# Patient Record
Sex: FEMALE | Race: WHITE
[De-identification: names, ages, dates, MRNs, and addresses within clinical notes are randomized per-mention and may not be internally consistent; named-entity substitution may affect disease eponyms.]

---

## 2019-08-28 ENCOUNTER — HOSPITAL ENCOUNTER (OUTPATIENT)
Dept: HOSPITAL 62 - ER | Age: 44
Setting detail: OBSERVATION
LOS: 2 days | Discharge: HOME | End: 2019-08-30
Attending: PAIN MEDICINE | Admitting: PAIN MEDICINE
Payer: MEDICARE

## 2019-08-28 DIAGNOSIS — E66.01: ICD-10-CM
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
## 2019-08-28 DIAGNOSIS — M25.551: ICD-10-CM

## 2019-08-28 DIAGNOSIS — Z79.899: ICD-10-CM

## 2019-08-28 DIAGNOSIS — Z79.82: ICD-10-CM

## 2019-08-28 DIAGNOSIS — M54.16: ICD-10-CM

## 2019-08-28 DIAGNOSIS — F32.9: ICD-10-CM

## 2019-08-28 DIAGNOSIS — G89.4: ICD-10-CM

## 2019-08-28 DIAGNOSIS — T40.2X5A: ICD-10-CM

## 2019-08-28 DIAGNOSIS — E78.5: ICD-10-CM

## 2019-08-28 DIAGNOSIS — K21.9: ICD-10-CM

## 2019-08-28 DIAGNOSIS — M96.1: ICD-10-CM

## 2019-08-28 DIAGNOSIS — F11.23: ICD-10-CM

## 2019-08-28 DIAGNOSIS — I10: ICD-10-CM

## 2019-08-28 DIAGNOSIS — M47.816: ICD-10-CM

## 2019-08-28 DIAGNOSIS — T85.615A: Primary | ICD-10-CM

## 2019-08-28 DIAGNOSIS — R29.6: ICD-10-CM

## 2019-08-28 DIAGNOSIS — M54.17: ICD-10-CM

## 2019-08-28 LAB
ADD MANUAL DIFF: NO
ALBUMIN SERPL-MCNC: 4.1 G/DL (ref 3.5–5)
ALP SERPL-CCNC: 109 U/L (ref 38–126)
ANION GAP SERPL CALC-SCNC: 10 MMOL/L (ref 5–19)
APPEARANCE UR: (no result)
APTT BLD: 24.2 SEC (ref 23.5–35.8)
APTT PPP: YELLOW S
AST SERPL-CCNC: 20 U/L (ref 14–36)
BASOPHILS # BLD AUTO: 0 10^3/UL (ref 0–0.2)
BASOPHILS NFR BLD AUTO: 0.4 % (ref 0–2)
BILIRUB DIRECT SERPL-MCNC: 0.4 MG/DL (ref 0–0.4)
BILIRUB SERPL-MCNC: 0.9 MG/DL (ref 0.2–1.3)
BILIRUB UR QL STRIP: NEGATIVE
BUN SERPL-MCNC: 10 MG/DL (ref 7–20)
CALCIUM: 9.5 MG/DL (ref 8.4–10.2)
CHLORIDE SERPL-SCNC: 98 MMOL/L (ref 98–107)
CO2 SERPL-SCNC: 27 MMOL/L (ref 22–30)
EOSINOPHIL # BLD AUTO: 0 10^3/UL (ref 0–0.6)
EOSINOPHIL NFR BLD AUTO: 0 % (ref 0–6)
ERYTHROCYTE [DISTWIDTH] IN BLOOD BY AUTOMATED COUNT: 13.1 % (ref 11.5–14)
GLUCOSE SERPL-MCNC: 126 MG/DL (ref 75–110)
GLUCOSE UR STRIP-MCNC: NEGATIVE MG/DL
HCT VFR BLD CALC: 38.9 % (ref 36–47)
HGB BLD-MCNC: 13.3 G/DL (ref 12–15.5)
INR PPP: 0.99
KETONES UR STRIP-MCNC: (no result) MG/DL
LYMPHOCYTES # BLD AUTO: 1.1 10^3/UL (ref 0.5–4.7)
LYMPHOCYTES NFR BLD AUTO: 10 % (ref 13–45)
MCH RBC QN AUTO: 30.2 PG (ref 27–33.4)
MCHC RBC AUTO-ENTMCNC: 34.2 G/DL (ref 32–36)
MCV RBC AUTO: 88 FL (ref 80–97)
MONOCYTES # BLD AUTO: 0.4 10^3/UL (ref 0.1–1.4)
MONOCYTES NFR BLD AUTO: 3.4 % (ref 3–13)
NEUTROPHILS # BLD AUTO: 9.1 10^3/UL (ref 1.7–8.2)
NEUTS SEG NFR BLD AUTO: 86.2 % (ref 42–78)
NITRITE UR QL STRIP: NEGATIVE
PH UR STRIP: 6 [PH] (ref 5–9)
PLATELET # BLD: 282 10^3/UL (ref 150–450)
POTASSIUM SERPL-SCNC: 3.5 MMOL/L (ref 3.6–5)
PROT SERPL-MCNC: 7.2 G/DL (ref 6.3–8.2)
PROT UR STRIP-MCNC: NEGATIVE MG/DL
PROTHROMBIN TIME: 13.1 SEC (ref 11.4–15.4)
RBC # BLD AUTO: 4.4 10^6/UL (ref 3.72–5.28)
SP GR UR STRIP: 1.02
TOTAL CELLS COUNTED % (AUTO): 100 %
UROBILINOGEN UR-MCNC: NEGATIVE MG/DL (ref ?–2)
WBC # BLD AUTO: 10.5 10^3/UL (ref 4–10.5)

## 2019-08-28 PROCEDURE — 85730 THROMBOPLASTIN TIME PARTIAL: CPT

## 2019-08-28 PROCEDURE — 62362 IMPLANT SPINE INFUSION PUMP: CPT

## 2019-08-28 PROCEDURE — 96376 TX/PRO/DX INJ SAME DRUG ADON: CPT

## 2019-08-28 PROCEDURE — 96361 HYDRATE IV INFUSION ADD-ON: CPT

## 2019-08-28 PROCEDURE — 85025 COMPLETE CBC W/AUTO DIFF WBC: CPT

## 2019-08-28 PROCEDURE — 62350 IMPLANT SPINAL CANAL CATH: CPT

## 2019-08-28 PROCEDURE — 96374 THER/PROPH/DIAG INJ IV PUSH: CPT

## 2019-08-28 PROCEDURE — 00400 ANES INTEGUMENTARY SYS NOS: CPT

## 2019-08-28 PROCEDURE — C1772 INFUSION PUMP, PROGRAMMABLE: HCPCS

## 2019-08-28 PROCEDURE — 81001 URINALYSIS AUTO W/SCOPE: CPT

## 2019-08-28 PROCEDURE — 85610 PROTHROMBIN TIME: CPT

## 2019-08-28 PROCEDURE — 93005 ELECTROCARDIOGRAM TRACING: CPT

## 2019-08-28 PROCEDURE — G0378 HOSPITAL OBSERVATION PER HR: HCPCS

## 2019-08-28 PROCEDURE — 93010 ELECTROCARDIOGRAM REPORT: CPT

## 2019-08-28 PROCEDURE — 71045 X-RAY EXAM CHEST 1 VIEW: CPT

## 2019-08-28 PROCEDURE — 36415 COLL VENOUS BLD VENIPUNCTURE: CPT

## 2019-08-28 PROCEDURE — 99285 EMERGENCY DEPT VISIT HI MDM: CPT

## 2019-08-28 PROCEDURE — 96375 TX/PRO/DX INJ NEW DRUG ADDON: CPT

## 2019-08-28 PROCEDURE — 80053 COMPREHEN METABOLIC PANEL: CPT

## 2019-08-28 RX ADMIN — ATORVASTATIN CALCIUM SCH MG: 20 TABLET, FILM COATED ORAL at 21:56

## 2019-08-28 RX ADMIN — SODIUM CHLORIDE PRN MLS/HR: 9 INJECTION, SOLUTION INTRAVENOUS at 23:18

## 2019-08-28 RX ADMIN — MORPHINE SULFATE SCH MG: 30 TABLET ORAL at 19:26

## 2019-08-28 RX ADMIN — SODIUM CHLORIDE PRN MLS/HR: 9 INJECTION, SOLUTION INTRAVENOUS at 19:16

## 2019-08-28 RX ADMIN — MORPHINE SULFATE SCH MG: 30 TABLET ORAL at 23:17

## 2019-08-28 RX ADMIN — PREGABALIN SCH MG: 50 CAPSULE ORAL at 19:27

## 2019-08-28 RX ADMIN — GABAPENTIN SCH MG: 300 CAPSULE ORAL at 21:56

## 2019-08-28 RX ADMIN — NORTRIPTYLINE HYDROCHLORIDE SCH MG: 25 CAPSULE ORAL at 21:56

## 2019-08-28 NOTE — ER DOCUMENT REPORT
ED Medical Screen (RME)





- General


Chief Complaint: Thigh Pain


Stated Complaint: THIGH PAIN


Time Seen by Provider: 08/28/19 15:25


Mode of Arrival: Wheelchair


Information source: Patient


Notes: 





This 43-year-old female presents the emergency department from Dr. Becerra  at 

[facility].  Dr. Becerra reports that the patient's morphine pump 

has stalled.  She will be taking her to the OR tomorrow to replace the pump.  In

the meantime she needs her to be treated for withdrawals.  She reports she needs

IV fluids morphine Zofran she would also like her to have labs.  Dr. Becerra did 

attempt to call and admit patient but was told by his supervisor they did not 

have any beds.














I have greeted and performed a rapid initial assessment of this patient.  A 

comprehensive ED assessment and evaluation of the patient, analysis of test 

results and completion of the medical decision making process will be conducted 

by additional ED providers.


Dictation of this chart was performed using voice recognition software; 

therefore, there may be some unintended grammatical errors.


TRAVEL OUTSIDE OF THE [place] IN LAST 30 DAYS: No





- Related Data


Allergies/Adverse Reactions: 


                                        





metformin Allergy (Verified 08/28/19 15:11)


   


codeine [Codeine] Adverse Reaction (Severe, Verified 02/12/15 11:11)


   CHEST PAINA


zolpidem tartrate [From Ambien] Adverse Reaction (Intermediate, Verified 

02/12/15 11:11)


   "HUNGOVER FELLING"











Past Medical History





- Social History


Frequency of alcohol use: None


Drug Abuse: None





- Past Medical History


Cardiac Medical History: Reports: Hx Coronary Artery Disease - HIGH CHOL, Hx H

ypertension


   Denies: Hx Heart Attack


Pulmonary Medical History: 


   Denies: Hx Asthma, Hx Bronchitis, Hx COPD, Hx Pneumonia


Neurological Medical History: Denies: Hx Cerebrovascular Accident, Hx Seizures


Renal/ Medical History: Denies: Hx Peritoneal Dialysis


Musculoskeltal Medical History: Denies Hx Arthritis





- Immunizations


Hx Diphtheria, Pertussis, Tetanus Vaccination: Yes





Physical Exam





- Vital signs


Vitals: 


                                        











Temp Pulse Resp BP Pulse Ox


 


 98.4 F   87   18   149/81 H  99 


 


 08/28/19 15:17  08/28/19 15:17  08/28/19 15:17  08/28/19 15:17  08/28/19 15:17














Course





- Vital Signs


Vital signs: 


                                        











Temp Pulse Resp BP Pulse Ox


 


 97.8 F   87   26 H  161/89 H  93 


 


 08/28/19 19:01  08/28/19 15:17  08/28/19 19:01  08/28/19 19:01  08/28/19 19:01














- Laboratory


Result Diagrams: 


                                 08/28/19 15:44





                                 08/28/19 15:44


Laboratory results interpreted by me: 


                                        











  08/28/19 08/28/19 08/28/19





  15:44 15:44 17:40


 


Lymph % (Auto)  10.0 L  


 


Absolute Neuts (auto)  9.1 H  


 


Seg Neutrophils %  86.2 H  


 


Sodium   135.0 L 


 


Potassium   3.5 L 


 


Glucose   126 H 


 


Urine Ketones    TRACE H














Doctor's Discharge





- Discharge


Clinical Impression: 


 Opiate withdrawal, Chronic pain





Condition: Stable


Disposition: ADMITTED AS OBSERVATION

## 2019-08-28 NOTE — EKG REPORT
SEVERITY:- OTHERWISE NORMAL ECG -

SINUS RHYTHM

ATRIAL PREMATURE COMPLEX

:

Confirmed by: Sundeep Bryant MD 28-Aug-2019 18:28:34

## 2019-08-28 NOTE — ER DOCUMENT REPORT
ED General





- General


Chief Complaint: Thigh Pain


Stated Complaint: THIGH PAIN


Time Seen by Provider: 08/28/19 15:25


Mode of Arrival: Wheelchair


TRAVEL OUTSIDE OF THE [place] IN LAST 30 DAYS: No





- HPI


Notes: 





Patient is a 43-year-old female with chronic back pain, status post pain pump 

implantation in 2015, who presents to the emergency department for evaluation of

vomiting, diarrhea, increased pain, and opiate withdrawal.  Evidently the pump 

started beeping on Sunday.  She was unaware of what that meant.  She contacted 

pain management on Monday evening when she started having severe nausea and 

diarrhea.  Her symptoms are consistent with withdrawal.  The beeping was 

evidently an indicator that the pump was no longer functioning.  She spoke with 

Dr. Becerra at [facility], unfortunately there were no beds 

available initially.  Basic laboratory investigations were obtained, medications

administered, and patient is currently awaiting a bed.  At this point the pat

ient states she just feels pain all over.  She has had multiple episodes of 

diarrhea, at least 10 in the last 24 hours.  She did get some improvement with 

some Imodium.  She is had nausea and dry heaves, but no taty emesis.





- Related Data


Allergies/Adverse Reactions: 


                                        





metformin Allergy (Verified 08/28/19 15:11)


   


codeine [Codeine] Adverse Reaction (Severe, Verified 02/12/15 11:11)


   CHEST PAINA


zolpidem tartrate [From Ambien] Adverse Reaction (Intermediate, Verified 

02/12/15 11:11)


   "HUNGOVER FELLING"











Past Medical History





- General


Information source: Patient





- Social History


Smoking Status: Never Smoker


Frequency of alcohol use: None


Drug Abuse: None


Family History: Reviewed & Not Pertinent


Patient has suicidal ideation: No


Patient has homicidal ideation: No





- Past Medical History


Cardiac Medical History: Reports: Hx Hypercholesterolemia, Hx Hypertension


   Denies: Hx Heart Attack


Pulmonary Medical History: 


   Denies: Hx Asthma, Hx Bronchitis, Hx COPD, Hx Pneumonia


Neurological Medical History: Denies: Hx Cerebrovascular Accident, Hx Seizures


Renal/ Medical History: Denies: Hx Peritoneal Dialysis


Musculoskeletal Medical History: Denies Hx Arthritis, Reports Other - Chronic 

back pain





- Immunizations


Hx Diphtheria, Pertussis, Tetanus Vaccination: Yes





Review of Systems





- Review of Systems


Constitutional: See HPI, Chills


EENT: No symptoms reported


Cardiovascular: No symptoms reported


Respiratory: No symptoms reported


Gastrointestinal: See HPI


Genitourinary: No symptoms reported


Musculoskeletal: See HPI


Skin: No symptoms reported


Neurological/Psychological: No symptoms reported





Physical Exam





- Vital signs


Vitals: 


                                        











Temp Pulse Resp BP Pulse Ox


 


 98.4 F   87   18   149/81 H  99 


 


 08/28/19 15:17  08/28/19 15:17  08/28/19 15:17  08/28/19 15:17  08/28/19 15:17














- Notes


Notes: 





This is a pleasant 43-year-old female who appears her stated age in no acute 

distress.  She is lying in bed, seems mildly uncomfortable.  Head is neuropsych 

and atraumatic.  Pupils are equal round, reactive to light.  Oral mucosa is 

moist.  Heart regular and rhythm, lungs are clear to station bilaterally.  

Abdomen soft, nontender, normoactive bowel sounds.  Examination of the spine 

yields well-healing surgical scar over the lumbosacral spine without signs of 

dehiscence or erythema.  She has marked tenderness to palpation throughout the 

spine.  Skin is warm and dry.  Peripheral pulses are equal.  Posterior calves 

nontender.





Course





- Re-evaluation


Re-evalutation: 





08/28/19 17:14


Patient presents to the emergency department for evaluation.  This patient is 

clearly in opiate withdrawal.  She was initially medicated with Zofran, 

morphine.  I further medicated her with morphine, Zofran, and a clonidine patch 

was applied.  Laboratory investigations were obtained and were largely 

unremarkable.  We do not have beds available.  I spoke with Dr. Becerra, who will

come to the emergency department to evaluate the patient and set her up for 

admission.





- Vital Signs


Vital signs: 


                                        











Temp Pulse Resp BP Pulse Ox


 


 97.6 F   68   17   143/71 H  98 


 


 08/28/19 22:58  08/28/19 22:58  08/28/19 22:58  08/28/19 22:58  08/28/19 22:58














- Laboratory


Result Diagrams: 


                                 08/28/19 15:44





                                 08/28/19 15:44


Laboratory results interpreted by me: 


                                        











  08/28/19 08/28/19 08/28/19





  15:44 15:44 17:40


 


Lymph % (Auto)  10.0 L  


 


Absolute Neuts (auto)  9.1 H  


 


Seg Neutrophils %  86.2 H  


 


Sodium   135.0 L 


 


Potassium   3.5 L 


 


Glucose   126 H 


 


Urine Ketones    TRACE H














- EKG Interpretation by Me


Additional EKG results interpreted by me: 





08/28/19 17:15


Sinus mechanism with a rate of 72 bpm, PAC noted.  Normal axis and intervals, no

acute ST changes concerning for ischemia or infarction.





Discharge





- Discharge


Clinical Impression: 


 Opiate withdrawal





Chronic pain


Qualifiers:


 Chronic pain type: other chronic pain Qualified Code(s): G89.29 - Other chronic

pain





Condition: Stable


Disposition: ADMITTED AS OBSERVATION


Admitting Provider: Dr. Becerra


Unit Admitted: Medical Floor

## 2019-08-29 PROCEDURE — 00PU03Z REMOVAL OF INFUSION DEVICE FROM SPINAL CANAL, OPEN APPROACH: ICD-10-PCS | Performed by: PAIN MEDICINE

## 2019-08-29 PROCEDURE — 0JPT0VZ REMOVAL OF INFUSION PUMP FROM TRUNK SUBCUTANEOUS TISSUE AND FASCIA, OPEN APPROACH: ICD-10-PCS | Performed by: PAIN MEDICINE

## 2019-08-29 PROCEDURE — 0JH70VZ INSERTION OF INFUSION PUMP INTO BACK SUBCUTANEOUS TISSUE AND FASCIA, OPEN APPROACH: ICD-10-PCS | Performed by: PAIN MEDICINE

## 2019-08-29 PROCEDURE — 00HU03Z INSERTION OF INFUSION DEVICE INTO SPINAL CANAL, OPEN APPROACH: ICD-10-PCS | Performed by: PAIN MEDICINE

## 2019-08-29 RX ADMIN — PANTOPRAZOLE SODIUM SCH MG: 40 TABLET, DELAYED RELEASE ORAL at 10:05

## 2019-08-29 RX ADMIN — ATORVASTATIN CALCIUM SCH MG: 20 TABLET, FILM COATED ORAL at 21:40

## 2019-08-29 RX ADMIN — NORTRIPTYLINE HYDROCHLORIDE SCH MG: 25 CAPSULE ORAL at 21:39

## 2019-08-29 RX ADMIN — PAROXETINE HYDROCHLORIDE SCH MG: 20 TABLET, FILM COATED ORAL at 10:06

## 2019-08-29 RX ADMIN — GABAPENTIN SCH MG: 300 CAPSULE ORAL at 10:06

## 2019-08-29 RX ADMIN — GABAPENTIN SCH MG: 300 CAPSULE ORAL at 21:40

## 2019-08-29 RX ADMIN — POTASSIUM CHLORIDE SCH MEQ: 750 TABLET, FILM COATED, EXTENDED RELEASE ORAL at 10:05

## 2019-08-29 RX ADMIN — CETIRIZINE HYDROCHLORIDE SCH MG: 10 TABLET, FILM COATED ORAL at 10:06

## 2019-08-29 RX ADMIN — AMLODIPINE BESYLATE SCH MG: 10 TABLET ORAL at 10:05

## 2019-08-29 RX ADMIN — MORPHINE SULFATE SCH MG: 30 TABLET ORAL at 23:25

## 2019-08-29 RX ADMIN — VITAMIN D, TAB 1000IU (100/BT) SCH UNIT: 25 TAB at 10:04

## 2019-08-29 RX ADMIN — SODIUM CHLORIDE PRN MLS/HR: 9 INJECTION, SOLUTION INTRAVENOUS at 05:10

## 2019-08-29 RX ADMIN — MORPHINE SULFATE SCH MG: 30 TABLET ORAL at 05:09

## 2019-08-29 RX ADMIN — MORPHINE SULFATE SCH MG: 30 TABLET ORAL at 17:02

## 2019-08-29 RX ADMIN — MORPHINE SULFATE SCH MG: 30 TABLET ORAL at 11:26

## 2019-08-29 RX ADMIN — CEFAZOLIN SODIUM SCH MLS/HR: 1 SOLUTION INTRAVENOUS at 21:41

## 2019-08-29 RX ADMIN — NORTRIPTYLINE HYDROCHLORIDE SCH MG: 25 CAPSULE ORAL at 08:43

## 2019-08-29 RX ADMIN — LOSARTAN POTASSIUM SCH MG: 50 TABLET, FILM COATED ORAL at 10:04

## 2019-08-29 RX ADMIN — PREGABALIN SCH MG: 50 CAPSULE ORAL at 05:09

## 2019-08-29 RX ADMIN — PREGABALIN SCH MG: 50 CAPSULE ORAL at 17:02

## 2019-08-29 RX ADMIN — FUROSEMIDE SCH MG: 40 TABLET ORAL at 10:05

## 2019-08-29 RX ADMIN — HYDROCHLOROTHIAZIDE SCH MG: 25 TABLET ORAL at 10:05

## 2019-08-29 NOTE — OPERATIVE REPORT
Operative Report


DATE OF SURGERY: 08/29/19


PREOPERATIVE DIAGNOSIS: Intrathecal pump malfunction


POSTOPERATIVE DIAGNOSIS: Same


OPERATION: Pump replacement reprogramming


SURGEON: LYNN SHERWOOD


ANESTHESIA: LMAC


TISSUE REMOVED OR ALTERED: pump


PROCEDURE: 


Patient was taken to the operating room placed comfortably in the supine 

position.  MAC anesthesia was administered monitors were applied.  Patient was 

prepped 2 times with chlorhexidine with appropriate drying time.  she was then 

draped in usual fashion.  Previous pump insertion site was readily identified 

the scar was anesthetized with 1% lidocaine with sodium bicarbonate followed by 

quarter percent bupivacaine with epinephrine sharp and blunt dissection were 

performed to remove the pump easily from the pump pocket site pocket site was 

intact with healthy scar tissue around the pump all stay sutures were removed 

spinal fluid access port was aspirated with free-flowing cerebrospinal fluid 4 

cc were removed pump was then disconnected from the catheter nipple 

simultaneously the new pump was prepped with the appropriate medication morphine

and bupivacaine and connected to the nipple.  The nipple was secured with 0 

Mersilene ties the pump pocket was enlarged with incision along the scar tissue 

medially inferiorly and superiorly to stay sutures were placed in the pump 1 

superior medial and one lateral superior lateral.  Pump pocket was irrigated 

with copious amounts of Betadine irrigation solution was then placed into the 

pocket with the spinal access port facing medially the stay sutures were 

secured.  The excess spinal catheter was placed behind the pump.  The capsule 

was then closed with inverted vertical mattress sutures using 2-0 Polysorb the 

skin was then closed with a running subcuticular 4-0 Polysorb the skin was 

sealed with Exoderm cement and when this was dry Telfa and Tegaderm were placed 

upon it was then taken to the PACU for further postoperative care and 

monitoring.

## 2019-08-29 NOTE — RADIOLOGY REPORT (SQ)
EXAM DESCRIPTION:  CHEST SINGLE VIEW



COMPLETED DATE/TIME:  8/29/2019 10:20 am



REASON FOR STUDY:  pre op



COMPARISON:  None.



EXAM PARAMETERS:  NUMBER OF VIEWS: One view.

TECHNIQUE: Single frontal radiographic view of the chest acquired.

RADIATION DOSE: NA

LIMITATIONS: None.



FINDINGS:  LUNGS AND PLEURA: No opacities, masses or pneumothorax. No pleural effusion.

MEDIASTINUM AND HILAR STRUCTURES: No masses.  Contour normal.

HEART AND VASCULAR STRUCTURES: Heart size is borderline.  There is no pulmonary edema.

BONES: No acute findings.

HARDWARE: None in the chest.

OTHER: No other significant finding.



IMPRESSION:  Cardiomegaly without pulmonary edema.



TECHNICAL DOCUMENTATION:  JOB ID:  [id number]

 2011 [employer]- All Rights Reserved



Reading location - IP/workstation name: [email or web address]

## 2019-08-30 VITALS — DIASTOLIC BLOOD PRESSURE: 81 MMHG | SYSTOLIC BLOOD PRESSURE: 134 MMHG

## 2019-08-30 RX ADMIN — PREGABALIN SCH MG: 50 CAPSULE ORAL at 05:25

## 2019-08-30 RX ADMIN — HYDROCHLOROTHIAZIDE SCH MG: 25 TABLET ORAL at 09:25

## 2019-08-30 RX ADMIN — NORTRIPTYLINE HYDROCHLORIDE SCH MG: 25 CAPSULE ORAL at 09:30

## 2019-08-30 RX ADMIN — PANTOPRAZOLE SODIUM SCH MG: 40 TABLET, DELAYED RELEASE ORAL at 09:27

## 2019-08-30 RX ADMIN — POTASSIUM CHLORIDE SCH MEQ: 750 TABLET, FILM COATED, EXTENDED RELEASE ORAL at 09:25

## 2019-08-30 RX ADMIN — CEFAZOLIN SODIUM SCH MLS/HR: 1 SOLUTION INTRAVENOUS at 05:27

## 2019-08-30 RX ADMIN — PAROXETINE HYDROCHLORIDE SCH MG: 20 TABLET, FILM COATED ORAL at 09:27

## 2019-08-30 RX ADMIN — VITAMIN D, TAB 1000IU (100/BT) SCH UNIT: 25 TAB at 09:26

## 2019-08-30 RX ADMIN — GABAPENTIN SCH MG: 300 CAPSULE ORAL at 09:26

## 2019-08-30 RX ADMIN — MORPHINE SULFATE SCH: 30 TABLET ORAL at 05:27

## 2019-08-30 RX ADMIN — FUROSEMIDE SCH MG: 40 TABLET ORAL at 09:29

## 2019-08-30 RX ADMIN — LOSARTAN POTASSIUM SCH MG: 50 TABLET, FILM COATED ORAL at 09:25

## 2019-08-30 RX ADMIN — AMLODIPINE BESYLATE SCH MG: 10 TABLET ORAL at 09:25

## 2019-08-30 RX ADMIN — CETIRIZINE HYDROCHLORIDE SCH MG: 10 TABLET, FILM COATED ORAL at 09:25
